# Patient Record
Sex: MALE | Race: WHITE | URBAN - METROPOLITAN AREA
[De-identification: names, ages, dates, MRNs, and addresses within clinical notes are randomized per-mention and may not be internally consistent; named-entity substitution may affect disease eponyms.]

---

## 2020-08-19 ENCOUNTER — HOSPITAL ENCOUNTER (OUTPATIENT)
Dept: MEDSURG UNIT | Facility: HOSPITAL | Age: 70
End: 2020-08-20
Attending: SURGERY | Admitting: SURGERY

## 2020-08-19 LAB
ABS NEUT (OLG): 17.41 X10(3)/MCL (ref 2.1–9.2)
ALBUMIN SERPL-MCNC: 3.9 GM/DL (ref 3.4–5)
ALBUMIN/GLOB SERPL: 1.6 RATIO (ref 1.1–2)
ALP SERPL-CCNC: 67 UNIT/L (ref 40–150)
ALT SERPL-CCNC: 52 UNIT/L (ref 0–55)
ANION GAP SERPL CALC-SCNC: 17 MMOL/L
APTT PPP: 27.2 SECOND(S) (ref 23.2–33.7)
AST SERPL-CCNC: 73 UNIT/L (ref 5–34)
BASOPHILS # BLD AUTO: 0.1 X10(3)/MCL (ref 0–0.2)
BASOPHILS NFR BLD AUTO: 0 %
BILIRUB SERPL-MCNC: 1.7 MG/DL
BILIRUBIN DIRECT+TOT PNL SERPL-MCNC: 0.6 MG/DL (ref 0–0.5)
BILIRUBIN DIRECT+TOT PNL SERPL-MCNC: 1.1 MG/DL (ref 0–0.8)
BUN SERPL-MCNC: 17.5 MG/DL (ref 8.4–25.7)
BUN SERPL-MCNC: 19 MG/DL (ref 8–26)
CALCIUM SERPL-MCNC: 8.7 MG/DL (ref 8.8–10)
CHLORIDE SERPL-SCNC: 101 MMOL/L (ref 98–109)
CHLORIDE SERPL-SCNC: 103 MMOL/L (ref 98–107)
CO2 SERPL-SCNC: 25 MMOL/L (ref 23–31)
CREAT SERPL-MCNC: 0.91 MG/DL (ref 0.73–1.18)
CREAT SERPL-MCNC: 1 MG/DL (ref 0.6–1.3)
ERYTHROCYTE [DISTWIDTH] IN BLOOD BY AUTOMATED COUNT: 14.2 % (ref 11.5–17)
GLOBULIN SER-MCNC: 2.5 GM/DL (ref 2.4–3.5)
GLUCOSE SERPL-MCNC: 104 MG/DL (ref 70–105)
GLUCOSE SERPL-MCNC: 104 MG/DL (ref 82–115)
HCT VFR BLD AUTO: 38.5 % (ref 42–52)
HCT VFR BLD CALC: 40 % (ref 38–51)
HGB BLD-MCNC: 12.1 GM/DL (ref 14–18)
HGB BLD-MCNC: 13.6 MG/DL (ref 12–17)
INR PPP: 1.1 (ref 0–1.3)
LYMPHOCYTES # BLD AUTO: 0.9 X10(3)/MCL (ref 0.6–4.6)
LYMPHOCYTES NFR BLD AUTO: 5 %
MCH RBC QN AUTO: 26.5 PG (ref 27–31)
MCHC RBC AUTO-ENTMCNC: 31.4 GM/DL (ref 33–36)
MCV RBC AUTO: 84.4 FL (ref 80–94)
MONOCYTES # BLD AUTO: 1.3 X10(3)/MCL (ref 0.1–1.3)
MONOCYTES NFR BLD AUTO: 6 %
NEUTROPHILS # BLD AUTO: 17.41 X10(3)/MCL (ref 2.1–9.2)
NEUTROPHILS NFR BLD AUTO: 88 %
PLATELET # BLD AUTO: 242 X10(3)/MCL (ref 130–400)
PMV BLD AUTO: 10.3 FL (ref 9.4–12.4)
POC IONIZED CALCIUM: 1.2 MMOL/L (ref 1.12–1.32)
POC TCO2: 23 MMOL/L (ref 24–29)
POTASSIUM BLD-SCNC: 4.8 MMOL/L (ref 3.5–4.9)
POTASSIUM SERPL-SCNC: 5.1 MMOL/L (ref 3.5–5.1)
PROT SERPL-MCNC: 6.4 GM/DL (ref 5.8–7.6)
PROTHROMBIN TIME: 13.3 SECOND(S) (ref 11.1–13.7)
RBC # BLD AUTO: 4.56 X10(6)/MCL (ref 4.7–6.1)
SODIUM BLD-SCNC: 135 MMOL/L (ref 138–146)
SODIUM SERPL-SCNC: 135 MMOL/L (ref 136–145)
WBC # SPEC AUTO: 19.9 X10(3)/MCL (ref 4.5–11.5)

## 2020-08-20 LAB
ABS NEUT (OLG): 8.5 X10(3)/MCL (ref 2.1–9.2)
ALBUMIN SERPL-MCNC: 3.6 GM/DL (ref 3.4–4.8)
ALBUMIN/GLOB SERPL: 1.3 RATIO (ref 1.1–2)
ALP SERPL-CCNC: 60 UNIT/L (ref 40–150)
ALT SERPL-CCNC: 44 UNIT/L (ref 0–55)
AST SERPL-CCNC: 56 UNIT/L (ref 5–34)
BASOPHILS # BLD AUTO: 0 X10(3)/MCL (ref 0–0.2)
BASOPHILS NFR BLD AUTO: 0 %
BILIRUB SERPL-MCNC: 2.3 MG/DL
BILIRUBIN DIRECT+TOT PNL SERPL-MCNC: 0.8 MG/DL (ref 0–0.5)
BILIRUBIN DIRECT+TOT PNL SERPL-MCNC: 1.5 MG/DL (ref 0–0.8)
BUN SERPL-MCNC: 15 MG/DL (ref 8.4–25.7)
CALCIUM SERPL-MCNC: 8.6 MG/DL (ref 8.8–10)
CHLORIDE SERPL-SCNC: 103 MMOL/L (ref 98–107)
CO2 SERPL-SCNC: 27 MMOL/L (ref 23–31)
CREAT SERPL-MCNC: 0.82 MG/DL (ref 0.73–1.18)
ERYTHROCYTE [DISTWIDTH] IN BLOOD BY AUTOMATED COUNT: 14.4 % (ref 11.5–17)
GLOBULIN SER-MCNC: 2.7 GM/DL (ref 2.4–3.5)
GLUCOSE SERPL-MCNC: 104 MG/DL (ref 82–115)
HCT VFR BLD AUTO: 37.8 % (ref 42–52)
HGB BLD-MCNC: 11.9 GM/DL (ref 14–18)
LACTATE SERPL-SCNC: 1.2 MMOL/L (ref 0.5–2.2)
LYMPHOCYTES # BLD AUTO: 1.3 X10(3)/MCL (ref 0.6–4.6)
LYMPHOCYTES NFR BLD AUTO: 12 %
MCH RBC QN AUTO: 26.2 PG (ref 27–31)
MCHC RBC AUTO-ENTMCNC: 31.5 GM/DL (ref 33–36)
MCV RBC AUTO: 83.3 FL (ref 80–94)
MONOCYTES # BLD AUTO: 1.3 X10(3)/MCL (ref 0.1–1.3)
MONOCYTES NFR BLD AUTO: 11 %
NEUTROPHILS # BLD AUTO: 8.5 X10(3)/MCL (ref 2.1–9.2)
NEUTROPHILS NFR BLD AUTO: 76 %
PLATELET # BLD AUTO: 228 X10(3)/MCL (ref 130–400)
PMV BLD AUTO: 10.5 FL (ref 9.4–12.4)
POTASSIUM SERPL-SCNC: 4.4 MMOL/L (ref 3.5–5.1)
PROT SERPL-MCNC: 6.3 GM/DL (ref 5.8–7.6)
RBC # BLD AUTO: 4.54 X10(6)/MCL (ref 4.7–6.1)
SODIUM SERPL-SCNC: 137 MMOL/L (ref 136–145)
WBC # SPEC AUTO: 11.2 X10(3)/MCL (ref 4.5–11.5)

## 2022-04-29 NOTE — H&P
Patient:   Rene Szymanski             MRN: [id number]            FIN: [id number]               Age:   70 years     Sex:  Male     :  1950   Associated Diagnoses:   Puncture wound of neck with complication   Author:   Alyssa Agustin MD      Basic Information   Source of history:  Self.    History limitation:  None.    Time seen:  Date/Time  2020 17:59:00.       Chief Complaint   2020 14:08 CDT      Pt to ED via [facility] cutting branch, branch fell and bounced up striking him under the jaw creating puncture wound primarily under R jaw. Wound dressed bleeding controlled, +LOC GCS 15 on arrrival.         History of Present Illness   70M presenting via EMS for laceration/puncture to the right lateral underside of the chin. States this morning he was cutting tree branches, a branch fell and struck him to the underside of his chin causing deep laceration to the underside of his right chin. Per EMS, there was bleeding but it is now controlled. The patient denies abdominal pain, NVD, fevers/chills, SOB, CP. He has hx of HTN and HLD but denies any other medical problems.         Review of Systems   Constitutional:  Negative except as documented in history of present illness.    Eye:  Negative except as documented in history of present illness.    Respiratory:  Negative except as documented in history of present illness.    Cardiovascular:  Negative except as documented in history of present illness.    Gastrointestinal:  Negative except as documented in history of present illness.    Genitourinary:  Negative except as documented in history of present illness.    Hematology/Lymphatics:  Negative except as documented in history of present illness.    Endocrine:  Negative except as documented in history of present illness.    Immunologic:  Negative except as documented in history of present illness.    Musculoskeletal:  Negative except as documented in history of present illness.     Integumentary:  Negative except as documented in history of present illness.    Neurologic:  Negative except as documented in history of present illness.    Psychiatric:  Negative except as documented in history of present illness.       Health Status   Allergies:    Allergic Reactions (Selected)  No Known Medication Allergies,    Allergies (1) Active Reaction  No Known Medication Allergies None Documented     Current medications:  (Selected)   Documented Medications  Documented  amLODIPine 5 mg oral tablet: 5 mg = 1 tab(s), Oral, Daily  sildenafil 100 mg oral tablet: 100 mg = 1 tab(s), Oral, Daily,    No qualifying data available     Problem list:    No qualifying data available  , HTN and HLD      Histories   Past Medical History:    No active or resolved past medical history items have been selected or recorded.   Family History:    No family history items have been selected or recorded.   Procedure history:    No active procedure history items have been selected or recorded.   Social History        Social & Psychosocial Habits    Tobacco  08/19/2020  Use: Never (less than 100 in l    Patient Wants Consult For Cessation Counseling No    Abuse/Neglect  08/19/2020  SHX Any signs of abuse or neglect No    Feels unsafe at home: No    Safe place to go: Yes  .        Physical Examination   Airway:  Adequate airway.    Breathing:  Adequate breathing.    Circulatory:  Adequate.    Neurological status:  Alert.    Condition:  Stable.    C-Collar:  Not present on arrival.    General:  Alert and oriented.    Eye:  Extraocular movements are intact, Normal conjunctiva.    HENT:  Normocephalic.    Neck:  4-5 cm laceration to the underside of right chin at the mandibular angle, ~2-3 cm deep with exposed mandible. No active bleeding.   .    Respiratory:  Respirations are non-labored, Symmetrical chest wall expansion.    Cardiovascular:  Normal rate.    Gastrointestinal:  Soft, Non-tender, Non-distended.    Vital Signs    8/19/2020 16:01 CDT      Peripheral Pulse Rate     78 bpm                             Heart Rate Monitored      81 bpm                             Respiratory Rate          16 br/min                             SpO2                      98 %                             Oxygen Therapy            Room air                             Systolic Blood Pressure   142 mmHg  HI                             Diastolic Blood Pressure  72 mmHg                             Mean Arterial Pressure, Cuff              95 mmHg    8/19/2020 14:08 CDT      Temperature Temporal Artery               35.8 DegC  LOW                             Peripheral Pulse Rate     80 bpm                             Respiratory Rate          16 br/min                             SpO2                      99 %                             Oxygen Therapy            Room air                             Systolic Blood Pressure   123 mmHg                             Diastolic Blood Pressure  70 mmHg     Measurements from flowsheet : Measurements   8/19/2020 14:08 CDT      Weight Dosing             77 kg                             Weight Measured and Calculated in Lbs     169.75 lb                             Weight Estimated          77 kg                             Height/Length Dosing      180 cm                             Height/Length Estimated   180 cm                             Body Mass Index Estimated 23.77 kg/m2     Neurologic:  Alert, Oriented, Sensation to face symmetric bilaterally. Appears to have some decreased motor to the right lips/cheeks when smiling.  .    Psychiatric:  Cooperative, Appropriate mood & affect.       Health Maintenance      Health Maintenance     Pending (in the next year)        OverDue           Obesity Screening due  01/01/20  and every 1  year(s)           Advance Directive due  01/02/20  and every 1  year(s)           Alcohol Misuse Screening due  01/02/20  and every 1  year(s)           Cognitive Screening due   01/02/20  and every 1  year(s)           Fall Risk Assessment due  01/02/20  and every 1  year(s)           Functional Assessment due  01/02/20  and every 1  year(s)        Due            ADL Screening due  08/19/20  and every 1  year(s)           Aspirin Therapy for CVD Prevention due  08/19/20  and every 1  year(s)           Medicare Annual Wellness Exam due  08/19/20  and every 1  year(s)     Satisfied (in the past 1 year)        Satisfied            Blood Pressure Screening on  08/19/20.  Satisfied by Delgado Weiss           Diabetes Screening on  08/19/20.  Satisfied by Abby Jean           Lipid Screening on  01/16/20.  Satisfied by Cindy Murray           Tetanus Vaccine on  08/19/20.  Satisfied by Delgado Weiss          Procedure   Procedure   Type: Laceration repair.        Review / Management   Results review:     Labs (Last four charted values)  WBC                  H 19.9 (AUG 19)   Hgb                  L 12.1 (AUG 19)   Hct                  L 38.5 (AUG 19)   Plt                  242 (AUG 19)   Na                   L 135 (AUG 19)   K                    5.1 (AUG 19)   CO2                  25 (AUG 19)   Cl                   103 (AUG 19)   Cr                   0.91 (AUG 19)   BUN                  17.5 (AUG 19)   Glucose Random       104 (AUG 19)   PT                   13.3 (AUG 19)   INR                  1.1 (AUG 19)   PTT                  27.2 (AUG 19) .    Radiology results   Rad Results Last 72 Hrs   Accession: [id number]  Order: CT Angio Neck W W/O Contrast  Report Dt/Tm: 08/19/2020 15:53  Report:   EXAMINATION:  CT Angio Neck W W/O Contrast     INDICATION:  Trauma     Comparison: None available     Technique:   Axial images were obtained through the lower neck and upper chest WITH  and WITHOUT the administration of intravenous contrast.   Coronal, sagittal, MIP and 3-D reconstructions obtained from the axial  data set.     Radiation Dose:  Total DLP: 758 mGy*cm     DISCUSSION:     There is  a common origin of the brachiocephalic trunk and left common  carotid artery.     The right common carotid arteries normal in caliber. There are  atherosclerotic changes of the carotid bulb without stenosis. The  internal carotid artery is partially obscured by motion artifact and  streak artifact from dental hardware, however the artery appears to be  intact. There is an area of contrast seen anterior to the internal  carotid artery the right oropharynx (series 8, image 58).     The left common carotid artery is normal in caliber. There are  atherosclerotic changes at the left carotid bulb without  hemodynamically significant stenosis. The left internal carotid artery  is partially obscured by motion artifact, however otherwise appears  intact.     The vertebral arteries are patent.     There is a soft tissue laceration along the right face with  subcutaneous emphysema      IMPRESSION:   1.  Evaluation limited by motion and streak artifact. The common and  internal carotid arteries appear intact.  2.  Small focus of contrast in the right pharyngeal wall, anterior to  the internal carotid artery, may represent extravasation or injury of  an external carotid artery branch.        Findings given to Dr. Weiss at the time dictation.    Accession: [id number]  Order: CT Maxillofacial W/O Contrast  Report Dt/Tm: 08/19/2020 15:41  Report:   Clinical History:  Trauma.     Technique:  Maxillofacial CT was performed without  contrast. There are sagittal  and coronal reconstructed images available for review.     Automatic exposure control was utilized to reduce the patient's  radiation dose.     Comparison:  No prior imaging available for comparison.     Findings:  The mandible is intact. Both mandibular condyles are well seated in  the temporomandibular fossa.   The mastoid air cells are clear bilaterally.   Fluid layering within the right maxillary sinus.  The globes are intact bilaterally. There is no retrobulbar  hemorrhage.  Laceration along the right inferior aspect of the mandibular soft  tissues with gas tracking along the medial aspect of the bone within  the  musculature to the level of the mandibular condyle  upper pharynx.     Impression  No acute fracture.  Laceration along the right inferior aspect of the mandibular soft  tissues with gas tracking along the medial aspect of the bone within  the  musculature to the level of the mandibular condyle  upper pharynx.      Accession: [id number]  Order: CT Head W/O Contrast  Report Dt/Tm: 08/19/2020 15:37  Report:   Clinical History:  Trauma     Technique:  Axial CT of the brain were obtained without contrast. There are  osseous reconstructed images available for review with coronal and  sagittal reconstructions.     Automatic exposure control was utilized to reduce the patient's  radiation dose.     Comparison:  No prior imaging available for comparison.     Findings:  No acute intracranial hemorrhage.  Mild cerebral atrophy with concordant ventricular enlargement.   There is normal gray white differentiation.   The osseous structures are normal.   The mastoid air cells are clear.   Debris within the right auditory canal.  The globes and orbital contents are normal bilaterally.  Debris layering in the right maxillary sinus.     Impression  No acute intracranial hemorrhage. Findings of chronic microvascular  ischemic disease.            Impression and Plan   Diagnosis     Puncture wound of neck with complication ([id number] S11.93XA).     70M presenting s/p laceration to right underside of chin  -CT head and maxillofacial negative  -CTA neck demonstrates small amount of contrast extravasation in the pharyngeal space - possibly external carotid injury   -Vascular consulted and recommends repeat CTA in the morning  -Will admit to observation under trauma surgery service  -Regular diet   -Pain control       Alyssa Agustin MD  PGY1 General Surgery

## 2022-04-29 NOTE — ED PROVIDER NOTES
Patient:   Rene Szymanski             MRN: [id number]            FIN: [id number]               Age:   70 years     Sex:  Male     :  1950   Associated Diagnoses:   Injury of artery   Author:   Maria Teresa Huynh MD      Basic Information   Time seen: Date & time 2020 14:50:00.   History source: Patient, EMS.   Arrival mode: Ambulance.   History limitation: None.   Additional information: Chief Complaint from Nursing Triage Note : Chief Complaint   2020 14:08 CDT      Chief Complaint           Pt to ED via [facility] cutting branch, branch fell and bounced up striking him under the jaw creating puncture wound primarily under R jaw. Wound dressed bleeding controlled, +LOC GCS 15 on arrrival.   .      History of Present Illness   The patient presents with Patient states that he was cutting tree branches when one bounce up and struck him under the jaw. +LOC and laceration under right jaw. states that tetanus shot it UTD (angelica, BRISEYDA).  and I, Maria Teresa Huynh MD, assumed care of this patient at 1500       71 y/o male presents to the ED via EMS with a wound to his right jaw area; pt states he was cutting tree branches, when one fell and struck him in the jaw. Pt confirms a LOC, and he states his tetanus is UTD..  The onset was just prior to arrival.  The course/duration of symptoms is constant.  The location where the incident occurred was at home.  Location: Right jaw. The character of symptoms is pain and bleeding.  The degree of symptoms is moderate.  The exacerbating factor is none.  The relieving factor is none.  Risk factors consist of none.  The patient's dominant hand is unknown.  Prior episodes: none.  Therapy today: emergency medical services.  Associated symptoms: none.        Review of Systems   Constitutional symptoms:  No fever, no chills.    Skin symptoms:  No jaundice, no rash   Eye symptoms:  Vision unchangedNo blurred vision,    Respiratory symptoms:  No shortness of  breath, no orthopnea, no cough, no sputum production.    Cardiovascular symptoms:  No chest pain, no palpitations, no diaphoresis, no peripheral edema.    Gastrointestinal symptoms:  No abdominal pain, no nausea, no vomiting, no diarrhea, no constipation.    Genitourinary symptoms:  No dysuria, no hematuria   Neurologic symptoms:  No headache, no dizziness.    Hematologic/Lymphatic symptoms:  Bleeding tendency negative,    Allergy/immunologic symptoms:  No impaired immunity,       Health Status   Allergies: No known allergies.   Medications:  (Selected)   Inpatient Medications  Ordered  Boostrix (Tdap) intramuscular suspension: 0.5 mL, form: Injection, IM, Once-Unscheduled, first dose 08/19/20 14:50:00 CDT.   Immunizations: Tetanus up to date.      Past Medical/ Family/ Social History   Medical history:    No active or resolved past medical history items have been selected or recorded..   Surgical history:    No active procedure history items have been selected or recorded..   Family history:    No family history items have been selected or recorded..   Social history: Tobacco use: Denies.      Physical Examination               Vital Signs   Vital Signs   8/19/2020 14:08 CDT      Temperature Temporal Artery               35.8 DegC  LOW                             Peripheral Pulse Rate     80 bpm                             Respiratory Rate          16 br/min                             SpO2                      99 %                             Oxygen Therapy            Room air                             Systolic Blood Pressure   123 mmHg                             Diastolic Blood Pressure  70 mmHg  .   Measurements   8/19/2020 14:08 CDT      Weight Dosing             77 kg                             Weight Measured and Calculated in Lbs     169.75 lb                             Weight Estimated          77 kg                             Height/Length Dosing      180 cm                             Height/Length  Estimated   180 cm                             Body Mass Index Estimated 23.77 kg/m2  .   Basic Oxygen Information   8/19/2020 14:08 CDT      SpO2                      99 %                             Oxygen Therapy            Room air  .   General:  Alert, no acute distress   Skin:  Warm, dry   Head:  Normocephalic   Neck:  Supple, trachea midline, laceration to right submandibular space   Eye:  Normal conjunctiva   Ears, nose, mouth and throat:  Oral mucosa moist   Cardiovascular:  Regular rate and rhythm, Normal peripheral perfusion, No edema   Respiratory:  Lungs are clear to auscultation, respirations are non-labored.    Gastrointestinal:  Soft, Nontender, Non distended, Normal bowel sounds   Neurological:  Alert and oriented to person, place, time, and situation.   Psychiatric:  Cooperative      Medical Decision Making   Rationale:  Patinet with puncture wound to neck. No active external bleeding and speaking clearly; however, imaging w/ concern for potential arterial injury. Case discussed with vascular surgery who recommended admission and repeat CT angiogram tomorrow. Patient to be admitted to trauma team. Findings and plan discussed with the patient, and he is agreeable to admission at this time.   .   Documents reviewed:  Emergency department nurses' notes.   Orders  Launch Order Profile (Selected)   Inpatient Orders  Ordered  Consult to Surgical Hospitalist On Call: 08/19/20 15:13:00 CDT, penetrating trauma to neck  ok for IV contrast ahead of labs: 08/19/20 15:09:00 CDT, ok for IV contrast ahead of labs  Ordered (Collected)  POC ISTAT Chem8 Request:: BLOOD, STAT collect, Collected, 08/19/20 15:35:36 CDT, Stop date 08/19/20 15:35:00 CDT, Lab Collect, Print Label By Order Location  Completed  Ancef: 2 gm, IV Piggyback, Once, Infuse over: 30 minute(s), first dose 08/19/20 15:59:00 CDT, stop date 08/19/20 15:59:00 CDT, STAT  Automated Diff: STAT collect, 08/19/20 15:35:00 CDT, Blood, Collected, Once, Stop  date 08/19/20 15:35:00 CDT, Lab Collect, Print Label By Order Location, 08/19/20 15:08:00 CDT  Boostrix (Tdap) intramuscular suspension: 0.5 mL, form: Injection, IM, Once-Unscheduled, first dose 08/19/20 14:50:00 CDT  CBC w/ Auto Diff: STAT collect, 08/19/20 15:35:36 CDT, BLOOD, Collected, Stop date 08/19/20 15:35:00 CDT, Lab Collect  CMP: STAT collect, 08/19/20 15:35:36 CDT, BLOOD, Collected, Stop date 08/19/20 15:35:00 CDT, Lab Collect  CT Angio Neck W W/O Contrast: Stat, 08/19/20 15:08:00 CDT, Trauma, None, Stretcher, Creatinine if needed per protocol, Rad Type, 08/19/20 15:08:00 CDT  CT Head W/O Contrast: Stat, 08/19/20 14:50:00 CDT, Head Injury, None, Stretcher, Rad Type, Schedule this test, 08/19/20 14:50:00 CDT  CT Maxillofacial W/O Contrast: Stat, 08/19/20 14:50:00 CDT, Trauma, puncture/laceration under jaw from tree branch, None, Stretcher, Rad Type, Schedule this test, 08/19/20 14:50:00 CDT  Estimated Glomerular Filtration Rate: STAT collect, 08/19/20 15:35:00 CDT, Blood, Collected, Stop date 08/19/20 15:35:00 CDT, Lab Collect, Print Label By Order Location, 08/19/20 15:08:00 CDT  PT: STAT collect, 08/19/20 15:35:36 CDT, BLOOD, Collected, Stop date 08/19/20 15:35:00 CDT, Lab Collect  PTT: STAT collect, 08/19/20 15:35:36 CDT, BLOOD, Collected, Stop date 08/19/20 15:35:00 CDT, Lab Collect  Point of Care iSTAT Chem8: Blood, Stat collect, Collected, 08/19/20 15:58:06 CDT  iopamidol: form: Soln, IV, AdHoc, first dose 08/19/20 15:30:11 CDT, stop date 08/19/20 15:30:11 CDT  morphine 1 mg/mL preservative-free injectable solution: 2 mg, form: PF Inj, IV, As Directed, first dose 08/19/20 16:22:00 CDT, STAT  morphine 4 mg/mL preservative-free intravenous solution: 4 mg, form: Injection, IV Push, Now, first dose 08/19/20 16:24:00 CDT, stop date 08/19/20 16:24:00 CDT, STAT, ( > 7 on pain scale).   Results review:  Lab results : Lab View   8/19/2020 15:58 CDT      POC Sodium                135 mmol/L  LOW                              POC Potassium             4.8 mmol/L                             POC Chloride              101 mmol/L                             POC Ion Calcium           1.20 mmol/L                             POC Glucose               104 mg/dL                             POC BUN                   19.0 mg/dL                             POC Creatinine            1.0 mg/dL                             POC AGAP                  17.0  NA                             POC Hb                    13.6 mg/dL                             POC Hct                   40.0 %                             POC TCO2                  23.0 mmol/L  LOW    8/19/2020 15:35 CDT      WBC                       19.9 x10(3)/mcL  HI                             RBC                       4.56 x10(6)/mcL  LOW                             Hgb                       12.1 gm/dL  LOW                             Hct                       38.5 %  LOW                             Platelet                  242 x10(3)/mcL                             MCV                       84.4 fL                             MCH                       26.5 pg  LOW                             MCHC                      31.4 gm/dL  LOW                             RDW                       14.2 %                             MPV                       10.3 fL                             Abs Neut                  17.41 x10(3)/mcL  HI                             Neutro Auto               88 %  NA                             Lymph Auto                5 %  NA                             Mono Auto                 6 %  NA                             Basophil Auto             0 %  NA                             Abs Neutro                17.41 x10(3)/mcL  HI                             Abs Lymph                 0.9 x10(3)/mcL                             Abs Mono                  1.3 x10(3)/mcL                             Abs Baso                  0.1 x10(3)/mcL  , Interpretation Labs  unremarkable.    Radiology results:  Reviewed radiologist's report, Rad Results (ST)  < 12 hrs   Accession: [id number]  Order: CT Angio Neck W W/O Contrast  Report Dt/Tm: 08/19/2020 15:53  Report:   EXAMINATION:  CT Angio Neck W W/O Contrast     INDICATION:  Trauma     Comparison: None available     Technique:   Axial images were obtained through the lower neck and upper chest WITH  and WITHOUT the administration of intravenous contrast.   Coronal, sagittal, MIP and 3-D reconstructions obtained from the axial  data set.     Radiation Dose:  Total DLP: 758 mGy*cm     DISCUSSION:     There is a common origin of the brachiocephalic trunk and left common  carotid artery.     The right common carotid arteries normal in caliber. There are  atherosclerotic changes of the carotid bulb without stenosis. The  internal carotid artery is partially obscured by motion artifact and  streak artifact from dental hardware, however the artery appears to be  intact. There is an area of contrast seen anterior to the internal  carotid artery the right oropharynx (series 8, image 58).     The left common carotid artery is normal in caliber. There are  atherosclerotic changes at the left carotid bulb without  hemodynamically significant stenosis. The left internal carotid artery  is partially obscured by motion artifact, however otherwise appears  intact.     The vertebral arteries are patent.     There is a soft tissue laceration along the right face with  subcutaneous emphysema      IMPRESSION:   1.  Evaluation limited by motion and streak artifact. The common and  internal carotid arteries appear intact.  2.  Small focus of contrast in the right pharyngeal wall, anterior to  the internal carotid artery, may represent extravasation or injury of  an external carotid artery branch.        Findings given to Dr. Weiss at the time dictation.    Accession: [id number]  Order: CT Maxillofacial W/O Contrast  Report Dt/Tm: 08/19/2020  15:41  Report:   Clinical History:  Trauma.     Technique:  Maxillofacial CT was performed without  contrast. There are sagittal  and coronal reconstructed images available for review.     Automatic exposure control was utilized to reduce the patient's  radiation dose.     Comparison:  No prior imaging available for comparison.     Findings:  The mandible is intact. Both mandibular condyles are well seated in  the temporomandibular fossa.   The mastoid air cells are clear bilaterally.   Fluid layering within the right maxillary sinus.  The globes are intact bilaterally. There is no retrobulbar hemorrhage.  Laceration along the right inferior aspect of the mandibular soft  tissues with gas tracking along the medial aspect of the bone within  the  musculature to the level of the mandibular condyle  upper pharynx.     Impression  No acute fracture.  Laceration along the right inferior aspect of the mandibular soft  tissues with gas tracking along the medial aspect of the bone within  the  musculature to the level of the mandibular condyle  upper pharynx.      Accession: [id number]  Order: CT Head W/O Contrast  Report Dt/Tm: 08/19/2020 15:37  Report:   Clinical History:  Trauma     Technique:  Axial CT of the brain were obtained without contrast. There are  osseous reconstructed images available for review with coronal and  sagittal reconstructions.     Automatic exposure control was utilized to reduce the patient's  radiation dose.     Comparison:  No prior imaging available for comparison.     Findings:  No acute intracranial hemorrhage.  Mild cerebral atrophy with concordant ventricular enlargement.   There is normal gray white differentiation.   The osseous structures are normal.   The mastoid air cells are clear.   Debris within the right auditory canal.  The globes and orbital contents are normal bilaterally.  Debris layering in the right maxillary sinus.     Impression  No acute intracranial  hemorrhage. Findings of chronic microvascular  ischemic disease.      .       Reexamination/ Reevaluation   Vital signs   Basic Oxygen Information   8/19/2020 14:08 CDT      SpO2                      99 %                             Oxygen Therapy            Room air        Impression and Plan   Diagnosis   Puncture wound of neck with complication (FTH65-AX S11.93XA)   Injury of artery ([id number] T14.8XXA)      Calls-Consults   -  8/19/2020 15:13:00 , Malaika MAY, Mukund LAYTON, trauma surgery, consult, evaluated at bedside, agrees w/ CT head, angiogram neck, will follow up studies .    -  8/19/2020 16:46:00 , Burak MAY, Franck Babb, vascular surgery, recommends admit for observation, repeat CT angiogram in 24 hours.    Plan   Condition: Stable.    Disposition: Admit time  8/19/2020 17:50:00, Place in Observation Unit, Malaika MAY, Mukund LAYTON.    Counseled: Patient, Regarding diagnosis, Regarding diagnostic results, Regarding treatment plan, Patient indicated understanding of instructions.    Notes: I, Asmita Lopez, acted solely as a scribe for and in the presence of Dr. Huynh who performed this service., I, Maria Teresa Huynh, have independently performed the history, physical, medical decision making and procedures as documented above and agree with the scribe's documentation. .

## 2022-05-04 NOTE — HISTORICAL [facility]ERNER
This is a historical note converted from Tio. Formatting and pictures may have been removed.  Please reference Tio for original formatting and attached multimedia. * Final Report *  Document Contains Addenda  Trauma Tertiary Survey [facility] and Discharge Summary  ?  Patient: ??Rene Szymanski??? ? ? ? ? ?MRN: [id number]?? ? ? ? ? ?FIN: [id number]?? ? ? ? ? ??  Age: ??70 years?? ? Sex: ?Male?? ? : ?1950?  Associated Diagnoses: ??None?  Author: ??Alyssa Agustin MD?  ?  Admission Information?  70M who presented to the ED via EMS for laceration/puncture to the right lateral underside of the chin. States that morning he was cutting tree branches, a branch fell and struck him to the underside of his chin causing deep laceration to the underside of his right chin. Per EMS, there was bleeding but it is was controlled when he presented to the ED. At the time of admission he denied any other complaints. The laceration was closed. CTA neck was concerning for possible external carotid artery injury. Vascular surgery was consulted and recommend the patient get admitted for repeat CTA neck the following morning. The following day, he was in stable condition with no complaints. Pain at laceration repair site was controlled and there was no active bleeding. 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 Dr. Sumner with vascular surgery reviewed the imaging and did not see concerning findings. He recommended the patient be discharged home with daily aspirin 81 mg and follow up with him in 4 weeks. The patient was comfortable being discharged home and amenable to the plan.?  ?   Date and Time: ?Admit Date and Time ?2020 14:06:00, Date and Time of Exam ?2020 12:48:00. ?  Mental Status Adequate for Exam: ?Yes. ?  Examiner: ?Examiner ?Alyssa Agustin MD. ?  ?  Physical Examination?  Vital Signs?  2020 12:00 CDT ? ? ?Heart Rate Monitored ? ? ?71 bpm ?  ?? ? ? ? ? ? ? ? ? ? ? ?  Oxygen Therapy ? ? ? ? ? ?Room air ?  8/20/2020 8:00 CDT ? ? ? Heart Rate Monitored ? ? ?66 bpm ?  ?? ? ? ? ? ? ? ? ? ? ? ? SpO2 ? ? ? ? ? ? ? ? ? ? ?98 % ?  ?? ? ? ? ? ? ? ? ? ? ? ? Oxygen Therapy ? ? ? ? ? ?Room air ?  8/20/2020 7:25 CDT ? ? ? Temperature Oral ? ? ? ? ?36.7 DegC ?  ?? ? ? ? ? ? ? ? ? ? ? ? Temperature Oral (calculated) ? ? ? ? ? ? 98.06 DegF ?  ?? ? ? ? ? ? ? ? ? ? ? ? Peripheral Pulse Rate ? ? 66 bpm ?  ?? ? ? ? ? ? ? ? ? ? ? ? Respiratory Rate ? ? ? ? ?20 br/min ?  ?? ? ? ? ? ? ? ? ? ? ? ? SpO2 ? ? ? ? ? ? ? ? ? ? ?96 % ?  ?? ? ? ? ? ? ? ? ? ? ? ? Systolic Blood Pressure ? 139 mmHg ?  ?? ? ? ? ? ? ? ? ? ? ? ? Diastolic Blood Pressure ?71 mmHg ?  ?? ? ? ? ? ? ? ? ? ? ? ? Mean Arterial Pressure, Cuff ? ? ? ? ? ? ?94 mmHg ?  8/20/2020 7:00 CDT ? ? ? Oxygen Therapy ? ? ? ? ? ?Room air ?  ?? ? ? ? ? ? ? ? ? ? ? ? Blood Pressure Location ? Left arm ?  8/20/2020 6:04 CDT ? ? ? Temperature Oral ? ? ? ? ?36.5 DegC ?  ?? ? ? ? ? ? ? ? ? ? ? ? Temperature Oral (calculated) ? ? ? ? ? ? 97.70 DegF ?  ?? ? ? ? ? ? ? ? ? ? ? ? Peripheral Pulse Rate ? ? 69 bpm ?  ?? ? ? ? ? ? ? ? ? ? ? ? SpO2 ? ? ? ? ? ? ? ? ? ? ?97 % ?  ?? ? ? ? ? ? ? ? ? ? ? ? Systolic Blood Pressure ? 130 mmHg ?  ?? ? ? ? ? ? ? ? ? ? ? ? Diastolic Blood Pressure ?71 mmHg ?  ?? ? ? ? ? ? ? ? ? ? ? ? Mean Arterial Pressure, Cuff ? ? ? ? ? ? ?91 mmHg ?  8/20/2020 4:00 CDT ? ? ? Heart Rate Monitored ? ? ?68 bpm ?  ?? ? ? ? ? ? ? ? ? ? ? ? Oxygen Therapy ? ? ? ? ? ?Room air ?  8/20/2020 0:35 CDT ? ? ? Temperature Oral ? ? ? ? ?36.9 DegC ?  ?? ? ? ? ? ? ? ? ? ? ? ? Temperature Oral (calculated) ? ? ? ? ? ? 98.42 DegF ?  ?? ? ? ? ? ? ? ? ? ? ? ? Peripheral Pulse Rate ? ? 66 bpm ?  ?? ? ? ? ? ? ? ? ? ? ? ? SpO2 ? ? ? ? ? ? ? ? ? ? ?99 % ?  ?? ? ? ? ? ? ? ? ? ? ? ? Systolic Blood Pressure ? 129 mmHg ?  ?? ? ? ? ? ? ? ? ? ? ? ? Diastolic Blood Pressure ?68 mmHg ?  ?? ? ? ? ? ? ? ? ? ? ? ? Mean Arterial Pressure, Cuff ? ? ? ? ? ? ?88 mmHg ?  8/20/2020  0:00 CDT ? ? ? Heart Rate Monitored ? ? ?67 bpm ?  ?? ? ? ? ? ? ? ? ? ? ? ? Oxygen Therapy ? ? ? ? ? ?Room air ?  8/19/2020 20:46 CDT ? ? ?Temperature Oral ? ? ? ? ?37 DegC ?  ?? ? ? ? ? ? ? ? ? ? ? ? Temperature Oral (calculated) ? ? ? ? ? ? 98.60 DegF ?  ?? ? ? ? ? ? ? ? ? ? ? ? Peripheral Pulse Rate ? ? 75 bpm ?  ?? ? ? ? ? ? ? ? ? ? ? ? SpO2 ? ? ? ? ? ? ? ? ? ? ?99 % ?  ?? ? ? ? ? ? ? ? ? ? ? ??Systolic Blood Pressure ? 143 mmHg ?HI??  ?? ? ? ? ? ? ? ? ? ? ? ? Diastolic Blood Pressure ?73 mmHg ?  ?? ? ? ? ? ? ? ? ? ? ? ? Mean Arterial Pressure, Cuff ? ? ? ? ? ? ?96 mmHg ?  8/19/2020 20:37 CDT ? ? ?Peripheral Pulse Rate ? ? 74 bpm ?  ?? ? ? ? ? ? ? ? ? ? ? ? Heart Rate Monitored ? ? ?74 bpm ?  ?? ? ? ? ? ? ? ? ? ? ? ? Respiratory Rate ? ? ? ? ?20 br/min ?  ?? ? ? ? ? ? ? ? ? ? ? ? SpO2 ? ? ? ? ? ? ? ? ? ? ?99 % ?  ?? ? ? ? ? ? ? ? ? ? ? ? Oxygen Therapy ? ? ? ? ? ?Room air ?  ?? ? ? ? ? ? ? ? ? ? ? ? Systolic Blood Pressure ? 117 mmHg ?  ?? ? ? ? ? ? ? ? ? ? ? ? Diastolic Blood Pressure ?69 mmHg ?  ?? ? ? ? ? ? ? ? ? ? ? ? Mean Arterial Pressure, Cuff ? ? ? ? ? ? ?85 mmHg ?  8/19/2020 20:00 CDT ? ? ?Oxygen Therapy ? ? ? ? ? ?Room air ?  8/19/2020 19:00 CDT ? ? ?Peripheral Pulse Rate ? ? 77 bpm ?  ?? ? ? ? ? ? ? ? ? ? ? ? Heart Rate Monitored ? ? ?75 bpm ?  ?? ? ? ? ? ? ? ? ? ? ? ? Respiratory Rate ? ? ? ? ?13 br/min ?  ?? ? ? ? ? ? ? ? ? ? ? ? SpO2 ? ? ? ? ? ? ? ? ? ? ?99 % ?  ?? ? ? ? ? ? ? ? ? ? ? ? Oxygen Therapy ? ? ? ? ? ?Room air ?  ?? ? ? ? ? ? ? ? ? ? ? ? Systolic Blood Pressure ? 138 mmHg ?  ?? ? ? ? ? ? ? ? ? ? ? ? Diastolic Blood Pressure ?89 mmHg ?  ?? ? ? ? ? ? ? ? ? ? ? ? Mean Arterial Pressure, Cuff ? ? ? ? ? ? ?105 mmHg ?  8/19/2020 18:00 CDT ? ? ?Peripheral Pulse Rate ? ? 67 bpm ?  ?? ? ? ? ? ? ? ? ? ? ? ? Heart Rate Monitored ? ? ?64 bpm ?  ?? ? ? ? ? ? ? ? ? ? ? ? Respiratory Rate ? ? ? ? ?16 br/min ?  ?? ? ? ? ? ? ? ? ? ? ? ? SpO2 ? ? ? ? ? ? ? ? ? ? ?97 % ?  ?? ? ? ? ? ? ? ? ? ? ? ? Oxygen  Therapy ? ? ? ? ? ?Room air ?  ?? ? ? ? ? ? ? ? ? ? ? ? Systolic Blood Pressure ? 127 mmHg ?  ?? ? ? ? ? ? ? ? ? ? ? ? Diastolic Blood Pressure ?66 mmHg ?  ?? ? ? ? ? ? ? ? ? ? ? ? Mean Arterial Pressure, Cuff ? ? ? ? ? ? ?86 mmHg ?  8/19/2020 17:00 CDT ? ? ?Peripheral Pulse Rate ? ? 70 bpm ?  ?? ? ? ? ? ? ? ? ? ? ? ? Heart Rate Monitored ? ? ?63 bpm ?  ?? ? ? ? ? ? ? ? ? ? ? ? Respiratory Rate ? ? ? ? ?21 br/min ?  ?? ? ? ? ? ? ? ? ? ? ? ? SpO2 ? ? ? ? ? ? ? ? ? ? ?98 % ?  ?? ? ? ? ? ? ? ? ? ? ? ? Oxygen Therapy ? ? ? ? ? ?Room air ?  ?? ? ? ? ? ? ? ? ? ? ? ? Systolic Blood Pressure ? 106 mmHg ?  ?? ? ? ? ? ? ? ? ? ? ? ??Diastolic Blood Pressure ?55 mmHg ?LOW??  ?? ? ? ? ? ? ? ? ? ? ? ? Mean Arterial Pressure, Cuff ? ? ? ? ? ? ?72 mmHg ?  8/19/2020 16:01 CDT ? ? ?Peripheral Pulse Rate ? ? 78 bpm ?  ?? ? ? ? ? ? ? ? ? ? ? ? Heart Rate Monitored ? ? ?81 bpm ?  ?? ? ? ? ? ? ? ? ? ? ? ? Respiratory Rate ? ? ? ? ?16 br/min ?  ?? ? ? ? ? ? ? ? ? ? ? ? SpO2 ? ? ? ? ? ? ? ? ? ? ?98 % ?  ?? ? ? ? ? ? ? ? ? ? ? ? Oxygen Therapy ? ? ? ? ? ?Room air ?  ?? ? ? ? ? ? ? ? ? ? ? ??Systolic Blood Pressure ? 142 mmHg ?HI??  ?? ? ? ? ? ? ? ? ? ? ? ? Diastolic Blood Pressure ?72 mmHg ?  ?? ? ? ? ? ? ? ? ? ? ? ? Mean Arterial Pressure, Cuff ? ? ? ? ? ? ?95 mmHg ?  8/19/2020 14:08 CDT ? ? ?Temperature Temporal Artery ? ? ? ? ? ? ? 35.8 DegC ?LOW??  ?? ? ? ? ? ? ? ? ? ? ? ? Peripheral Pulse Rate ? ? 80 bpm ?  ?? ? ? ? ? ? ? ? ? ? ? ? Respiratory Rate ? ? ? ? ?16 br/min ?  ?? ? ? ? ? ? ? ? ? ? ? ? SpO2 ? ? ? ? ? ? ? ? ? ? ?99 % ?  ?? ? ? ? ? ? ? ? ? ? ? ? Oxygen Therapy ? ? ? ? ? ?Room air ?  ?? ? ? ? ? ? ? ? ? ? ? ? Systolic Blood Pressure ? 123 mmHg ?  ?? ? ? ? ? ? ? ? ? ? ? ? Diastolic Blood Pressure ?70 mmHg ??  Glascow Coma Scale: ??  ?? ? Eye opening response: Spontaneous = 4. ?  ?? ? Motor response: Obeys verbal instruction = 6. ?  ?? ? Verbal response: Oriented = 5. ?  HEENT: ? ? ? Head: Lacerations, Laceration repaired at the  inferior aspect of the lateral right chine, C/D/I with no erythema or signs of infection . ?  Cardiovascular: ? ? ? Heart: Within normal limits, Regular rate and rhythm. ?  Respiratory: ?Respirations are non-labored, Symmetrical chest wall expansion. ?  Gastrointestinal: ?Soft, Non-tender, Non-distended. ?  Genitourinary: ?No costovertebral angle tenderness. ?  Musculoskeletal: ?Normal range of motion, Normal strength, No tenderness, No swelling. ?  ?  Assessment and Plan?  Assessment: ?70M presented s/p laceration to the R lateral inferior aspect of chin  -Laceration is s/p repair with chromic sutures  -Vascular surgery has cleared the patient for discharge with daily aspirin 81mg and follow up in 4 weeks?  -Discharge home today?  ?  ?  ?  . ?  ?  Results Review?  C-Spine?  Rad Results Last 72 Hrs?  Accession:?[id number]  Order:?CT Angio Neck W W/O Contrast  Report Dt/Tm:?08/20/2020 09:39  Report:?  EXAMINATION:  CT Angio Neck W W/O Contrast  ?  INDICATION:  Trauma  ?  Comparison: CT angiogram neck dated 8/19/2020  ?  Technique:?  Axial images were obtained through the lower neck and upper chest WITH  and WITHOUT the administration of intravenous contrast.?  Coronal, sagittal, MIP and 3-D reconstructions obtained from the axial  data set.  ?  Radiation Dose:  Total DLP: 492 mGy*cm  ?  DISCUSSION:  ?  The origins of the great vessels are patent.  ?  The common carotid arteries are normal in caliber. There are mild  atherosclerotic changes at the carotid bulbs without hemodialysis  significant stenosis. The left internal carotid artery is patent and  normal in caliber.  ?  There is a mild contour change of the right internal carotid artery at  the level of injury, with increase in the caliber measuring up to 6 x  7 mm (series 7, images 51 through 58, sagittal series 13, image 56).  There is no definite filling defect or occlusion identified. There is  no evidence of contrast extravasation. There is no large  hematoma in  the deep soft tissues of the neck.  ?  A previously seen focus of contrast anterior to the right internal  carotid artery corresponds to a branch of the external carotid artery,  now better visualized.  ?  The vertebral arteries are patent and normal caliber.  ?  There is subcutaneous edema with scattered foci of emphysema in the  right neck and face.  ?  ?  IMPRESSION:?  1. 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  2. ?No evidence of contrast extravasation, filling defect or large  vessel occlusion.  ?  Accession:?[id number]  Order:?CT Angio Neck W W/O Contrast  Report Dt/Tm:?08/19/2020 15:53  Report:?  EXAMINATION:  CT Angio Neck W W/O Contrast  ?  INDICATION:  Trauma  ?  Comparison: None available  ?  Technique:?  Axial images were obtained through the lower neck and upper chest WITH  and WITHOUT the administration of intravenous contrast.?  Coronal, sagittal, MIP and 3-D reconstructions obtained from the axial  data set.  ?  Radiation Dose:  Total DLP: 758 mGy*cm  ?  DISCUSSION:  ?  There is a common origin of the brachiocephalic trunk and left common  carotid artery.  ?  The right common carotid arteries normal in caliber. There are  atherosclerotic changes of the carotid bulb without stenosis. The  internal carotid artery is partially obscured by motion artifact and  streak artifact from dental hardware, however the artery appears to be  intact. There is an area of contrast seen anterior to the internal  carotid artery the right oropharynx (series 8, image 58).  ?  The left common carotid artery is normal in caliber. There are  atherosclerotic changes at the left carotid bulb without  hemodynamically significant stenosis. The left internal carotid artery  is partially obscured by motion artifact, however otherwise appears  intact.  ?  The vertebral arteries are patent.  ?  There is a  soft tissue laceration along the right face with  subcutaneous emphysema?  ?  IMPRESSION:?  1. ?Evaluation limited by motion and streak artifact. The common and  internal carotid arteries appear intact.  2. ?Small focus of contrast in the right pharyngeal wall, anterior to  the internal carotid artery, may represent extravasation or injury of  an external carotid artery branch.  ?  ?  Findings given to Dr. Weiss at the time dictation.  ?  Accession:?[id number]  Order:?CT Maxillofacial W/O Contrast  Report Dt/Tm:?08/19/2020 15:41  Report:?  Clinical History:  Trauma.  ?  Technique:  Maxillofacial CT was performed without ?contrast. There are sagittal  and coronal reconstructed images available for review.  ?  Automatic exposure control was utilized to reduce the patients  radiation dose.  ?  Comparison:  No prior imaging available for comparison.  ?  Findings:  The mandible is intact. Both mandibular condyles are well seated in  the temporomandibular fossa.?  The mastoid air cells are clear bilaterally.?  Fluid layering within the right maxillary sinus.  The globes are intact bilaterally. There is no retrobulbar hemorrhage.  Laceration along the right inferior aspect of the mandibular soft  tissues with gas tracking along the medial aspect of the bone within  the  musculature to the level of the mandibular condyle  upper pharynx.  ?  Impression  No acute fracture.  Laceration along the right inferior aspect of the mandibular soft  tissues with gas tracking along the medial aspect of the bone within  the  musculature to the level of the mandibular condyle  upper pharynx.  ?  ?  Accession:?[id number]  Order:?CT Head W/O Contrast  Report Dt/Tm:?08/19/2020 15:37  Report:?  Clinical History:  Trauma  ?  Technique:  Axial CT of the brain were obtained without contrast. There are  osseous reconstructed images available for review with coronal and  sagittal reconstructions.  ?  Automatic exposure  control was utilized to reduce the patients  radiation dose.  ?  Comparison:  No prior imaging available for comparison.  ?  Findings:  No acute intracranial hemorrhage.  Mild cerebral atrophy with concordant ventricular enlargement.?  There is normal gray white differentiation.?  The osseous structures are normal.?  The mastoid air cells are clear.?  Debris within the right auditory canal.  The globes and orbital contents are normal bilaterally.  Debris layering in the right maxillary sinus.  ?  Impression  No acute intracranial hemorrhage. Findings of chronic microvascular  ischemic disease.  ?  ?  ?  ?  Addendum by Jorge MAY, Sp [place] on August 20, 2020 15:33 CDT?(Verified)  Agree with above  ?